# Patient Record
Sex: MALE | Race: WHITE
[De-identification: names, ages, dates, MRNs, and addresses within clinical notes are randomized per-mention and may not be internally consistent; named-entity substitution may affect disease eponyms.]

---

## 2019-09-25 ENCOUNTER — HOSPITAL ENCOUNTER (EMERGENCY)
Dept: HOSPITAL 47 - EC | Age: 59
Discharge: HOME | End: 2019-09-25
Payer: COMMERCIAL

## 2019-09-25 VITALS
HEART RATE: 70 BPM | DIASTOLIC BLOOD PRESSURE: 70 MMHG | SYSTOLIC BLOOD PRESSURE: 121 MMHG | TEMPERATURE: 97.8 F | RESPIRATION RATE: 18 BRPM

## 2019-09-25 DIAGNOSIS — F17.200: ICD-10-CM

## 2019-09-25 DIAGNOSIS — H53.2: Primary | ICD-10-CM

## 2019-09-25 DIAGNOSIS — H55.09: ICD-10-CM

## 2019-09-25 LAB
ALBUMIN SERPL-MCNC: 4 G/DL (ref 3.5–5)
ALP SERPL-CCNC: 83 U/L (ref 38–126)
ALT SERPL-CCNC: 18 U/L (ref 21–72)
ANION GAP SERPL CALC-SCNC: 9 MMOL/L
AST SERPL-CCNC: 21 U/L (ref 17–59)
BASOPHILS # BLD AUTO: 0.2 K/UL (ref 0–0.2)
BASOPHILS NFR BLD AUTO: 2 %
BUN SERPL-SCNC: 10 MG/DL (ref 9–20)
CALCIUM SPEC-MCNC: 9.3 MG/DL (ref 8.4–10.2)
CHLORIDE SERPL-SCNC: 105 MMOL/L (ref 98–107)
CO2 SERPL-SCNC: 27 MMOL/L (ref 22–30)
EOSINOPHIL # BLD AUTO: 0.3 K/UL (ref 0–0.7)
EOSINOPHIL NFR BLD AUTO: 2 %
ERYTHROCYTE [DISTWIDTH] IN BLOOD BY AUTOMATED COUNT: 4.89 M/UL (ref 4.3–5.9)
ERYTHROCYTE [DISTWIDTH] IN BLOOD: 12.3 % (ref 11.5–15.5)
GLUCOSE SERPL-MCNC: 92 MG/DL (ref 74–99)
HCT VFR BLD AUTO: 45.1 % (ref 39–53)
HGB BLD-MCNC: 14.9 GM/DL (ref 13–17.5)
LYMPHOCYTES # SPEC AUTO: 3 K/UL (ref 1–4.8)
LYMPHOCYTES NFR SPEC AUTO: 27 %
MAGNESIUM SPEC-SCNC: 2.1 MG/DL (ref 1.6–2.3)
MCH RBC QN AUTO: 30.5 PG (ref 25–35)
MCHC RBC AUTO-ENTMCNC: 33.1 G/DL (ref 31–37)
MCV RBC AUTO: 92.2 FL (ref 80–100)
MONOCYTES # BLD AUTO: 0.9 K/UL (ref 0–1)
MONOCYTES NFR BLD AUTO: 8 %
NEUTROPHILS # BLD AUTO: 6.6 K/UL (ref 1.3–7.7)
NEUTROPHILS NFR BLD AUTO: 59 %
PLATELET # BLD AUTO: 239 K/UL (ref 150–450)
POTASSIUM SERPL-SCNC: 3.8 MMOL/L (ref 3.5–5.1)
PROT SERPL-MCNC: 6.9 G/DL (ref 6.3–8.2)
SODIUM SERPL-SCNC: 141 MMOL/L (ref 137–145)
WBC # BLD AUTO: 11.2 K/UL (ref 3.8–10.6)

## 2019-09-25 PROCEDURE — 85025 COMPLETE CBC W/AUTO DIFF WBC: CPT

## 2019-09-25 PROCEDURE — 99284 EMERGENCY DEPT VISIT MOD MDM: CPT

## 2019-09-25 PROCEDURE — 83735 ASSAY OF MAGNESIUM: CPT

## 2019-09-25 PROCEDURE — 80053 COMPREHEN METABOLIC PANEL: CPT

## 2019-09-25 PROCEDURE — 93005 ELECTROCARDIOGRAM TRACING: CPT

## 2019-09-25 PROCEDURE — 70450 CT HEAD/BRAIN W/O DYE: CPT

## 2019-09-25 PROCEDURE — 36415 COLL VENOUS BLD VENIPUNCTURE: CPT

## 2019-09-25 NOTE — ED
Eye Problem HPI





- General


Chief complaint: Eye Problems


Stated complaint: Blurred vision


Time Seen by Provider: 09/25/19 18:23


Source: patient


Mode of arrival: ambulatory


Limitations: no limitations





- History of Present Illness


Initial comments: 


59-year-old male patient presents to the emergency department today for 

evaluation of double and blurred vision.  Patient states that he experiences 

whenever looking to the right.  Patient states when he drives he can see double 

lines on the road.  Patient states he has had some intermittent dizziness with 

this.  Symptoms started on Saturday.  He denies any head injury or history of 

similar symptoms.  States he did see a ophthalmologist on Monday who was unable 

to find a cause for his symptoms.  Patient denies any headache, numbness, 

tingling, weakness to his extremities.  States he is generally healthy with no 

chronic medical conditions.  He does not take any medications.  Patient has not 

seen a primary care physician for the last couple of years.  Denies alcohol or 

drug use. Patient denies any recent rash, fever, chills, shortness breath, chest

pain, abdominal pain, nausea, vomiting, diarrhea, constipation, back pain, 

hematuria, dysuria, urinary urgency, urinary frequency, or any other complaints.








- Related Data


                                Home Medications











 Medication  Instructions  Recorded  Confirmed


 


No Known Home Medications  09/25/19 09/25/19











                                    Allergies











Allergy/AdvReac Type Severity Reaction Status Date / Time


 


No Known Allergies Allergy   Verified 09/25/19 19:04














Review of Systems


ROS Statement: 


Those systems with pertinent positive or pertinent negative responses have been 

documented in the HPI.





ROS Other: All systems not noted in ROS Statement are negative.





Past Medical History


Past Medical History: No Reported History


History of Any Multi-Drug Resistant Organisms: None Reported


Additional Past Surgical History / Comment(s): dental surgery


Past Psychological History: No Psychological Hx Reported


Smoking Status: Current every day smoker


Past Alcohol Use History: None Reported


Past Drug Use History: None Reported





General Exam


Limitations: no limitations


General appearance: alert, in no apparent distress, other (This is a well-

developed, well-nourished adult male patient in no acute distress.  Vital signs 

upon presentation are temperature 97.9F, pulse 102, respirations 20, blood 

pressure 131/74, pulse ox 97% on room air.)


Eye exam: Present: normal appearance, PERRL, EOMI, nystagmus (Right horizontal 

gaze nystagmus).  Absent: scleral icterus, conjunctival injection, periorbital 

swelling


ENT exam: Present: normal exam, normal oropharynx, mucous membranes moist


Respiratory exam: Present: normal lung sounds bilaterally.  Absent: respiratory 

distress, wheezes, rales, rhonchi, stridor


Cardiovascular Exam: Present: regular rate, normal rhythm, normal heart sounds. 

Absent: systolic murmur, diastolic murmur, rubs, gallop, clicks


GI/Abdominal exam: Present: soft, normal bowel sounds.  Absent: distended, 

tenderness, guarding, rebound, rigid


Neurological exam: Present: alert, oriented X3, CN II-XII intact


Psychiatric exam: Present: normal affect, normal mood


Skin exam: Present: warm, dry, intact, normal color.  Absent: rash





Course


                                   Vital Signs











  09/25/19 09/25/19 09/25/19





  18:12 19:16 20:30


 


Temperature 97.9 F 98.1 F 97.8 F


 


Pulse Rate 102 H 71 70


 


Respiratory 20 16 18





Rate   


 


Blood Pressure 131/74 108/60 121/70


 


O2 Sat by Pulse 97 99 97





Oximetry   














Medical Decision Making





- Medical Decision Making


59-year-old male patient presented to the emergency department today for 

evaluation of monocular diplopia and intermittent dizziness.  Physical 

examination does reveal right horizontal gaze nystagmus but is otherwise 

unremarkable.  He is neurologically intact with no focal deficits.  Labs 

reviewed and are unremarkable.  EKG showed normal sinus rhythm.  CT brain was 

obtained and showed no acute abnormalities.  I did discuss findings and results 

with the patient.  Be discharged home to follow-up with the primary care 

physician, discuss referral to neurology, discussed MRI.  Return parameters were

discussed in detail.  He verbalizes understanding and agrees with this plan.








- Lab Data


Result diagrams: 


                                 09/25/19 19:05





                                 09/25/19 19:05


                                   Lab Results











  09/25/19 09/25/19 Range/Units





  19:05 19:05 


 


WBC  11.2 H   (3.8-10.6)  k/uL


 


RBC  4.89   (4.30-5.90)  m/uL


 


Hgb  14.9   (13.0-17.5)  gm/dL


 


Hct  45.1   (39.0-53.0)  %


 


MCV  92.2   (80.0-100.0)  fL


 


MCH  30.5   (25.0-35.0)  pg


 


MCHC  33.1   (31.0-37.0)  g/dL


 


RDW  12.3   (11.5-15.5)  %


 


Plt Count  239   (150-450)  k/uL


 


Neutrophils %  59   %


 


Lymphocytes %  27   %


 


Monocytes %  8   %


 


Eosinophils %  2   %


 


Basophils %  2   %


 


Neutrophils #  6.6   (1.3-7.7)  k/uL


 


Lymphocytes #  3.0   (1.0-4.8)  k/uL


 


Monocytes #  0.9   (0-1.0)  k/uL


 


Eosinophils #  0.3   (0-0.7)  k/uL


 


Basophils #  0.2   (0-0.2)  k/uL


 


Sodium   141  (137-145)  mmol/L


 


Potassium   3.8  (3.5-5.1)  mmol/L


 


Chloride   105  ()  mmol/L


 


Carbon Dioxide   27  (22-30)  mmol/L


 


Anion Gap   9  mmol/L


 


BUN   10  (9-20)  mg/dL


 


Creatinine   0.89  (0.66-1.25)  mg/dL


 


Est GFR (CKD-EPI)AfAm   >90  (>60 ml/min/1.73 sqM)  


 


Est GFR (CKD-EPI)NonAf   >90  (>60 ml/min/1.73 sqM)  


 


Glucose   92  (74-99)  mg/dL


 


Calcium   9.3  (8.4-10.2)  mg/dL


 


Magnesium   2.1  (1.6-2.3)  mg/dL


 


Total Bilirubin   0.6  (0.2-1.3)  mg/dL


 


AST   21  (17-59)  U/L


 


ALT   18 L  (21-72)  U/L


 


Alkaline Phosphatase   83  ()  U/L


 


Total Protein   6.9  (6.3-8.2)  g/dL


 


Albumin   4.0  (3.5-5.0)  g/dL














- EKG Data


-: EKG Interpreted by Me


EKG Comments: 





EKG obtained at 1944 shows sinus rhythm with PACs, ventricular rate 73, [place] 

interval 178, QRS duration 88, , .  No evidence of ST elevation or 

depression.





- Radiology Data


Radiology results: report reviewed, image reviewed


CT brain without contrast was obtained.  Report reviewed in its entirety.  

Impression by Dr. Dwyer shows No acute intracranial hemorrhage or midline shift. 

Orbits appear symmetric on CT.  There is further concern this patient with 

visual change MRI would be recommended to evaluate for increased intracranial 

pressure orbital pathology.





Disposition


Clinical Impression: 


 Diplopia





Disposition: HOME SELF-CARE


Condition: Good


Instructions (If sedation given, give patient instructions):  Diplopia (ED)


Additional Instructions: 


Increase fluids.  Follow-up with primary care physician for further evaluation 

and possible neurology referral.  Return to the emergency department for any 

new, worsening, or concerning symptoms.


Is patient prescribed a controlled substance at d/c from ED?: No


Referrals: 


Gus Bhatt MD [STAFF PHYSICIAN] - 1-2 days


Time of Disposition: 20:14

## 2019-09-25 NOTE — CT
EXAMINATION TYPE: CT brain wo con

 

DATE OF EXAM: 9/25/2019

 

COMPARISON: None

 

HISTORY: Blurry vision

 

CT DLP: 1012.7 mGycm

Automated exposure control for dose reduction was used.

 

TECHNIQUE: CT scan of the head is performed without contrast.

 

FINDINGS:   There is no acute intracranial hemorrhage, mass effect, or midline shift identified. No s
uspicious extra axial fluid collection is seen.  The ventricles and sulci are within normal limits in
 size. There is scant mucosal thickening within the ethmoid sinuses. The globes are intact and the re
maining visualized sinuses are clear. Lenses are symmetric and in place. Extraocular muscles are also
 symmetric. Optic nerves are unremarkable. No preseptal or post septal inflammatory fat stranding.

 

IMPRESSION:  

1. No acute intracranial hemorrhage or midline shift.

2. Orbits appear symmetric on CT. There is further concern in this patient with visual change MRI wou
ld be recommended to evaluate for increased intracranial pressure or orbital pathology.

## 2024-10-28 ENCOUNTER — HOSPITAL ENCOUNTER (OUTPATIENT)
Dept: HOSPITAL 47 - RADCTMAIN | Age: 64
Discharge: HOME | End: 2024-10-28
Attending: FAMILY MEDICINE
Payer: MEDICARE

## 2024-10-28 PROCEDURE — 71271 CT THORAX LUNG CANCER SCR C-: CPT

## 2024-10-28 NOTE — CTL
EXAMINATION TYPE:  CT Low Dose Lung

 

DATE OF EXAM ORDERED: 10/28/2024

 

COMPARISON: None

 

CLINICAL INDICATION: Male, 64 years old with history of Z12.2 ENCNTR SCREEN FOR MALIGNANT NEOPLASM OF
 RE; PHH, Personal hx nicotine dependence 1 ppd x 44 years  current smoker no concerns, Lung cancer s
creening, History of Smoking/tobacco use.

 

TECHNIQUE: Low dose computed tomography scan was performed through the chest at 1 mm thick sections a
nd reconstructed images in multiple planes at 1 mm and 5 mm thick sections.

CT DLP: 71 mGycm

CT CTDI: 1.95 mGy

Automated exposure control for dose reduction was used.

CT DIAGNOSTIC QUALITY: Satisfactory

 

FINDINGS:

 

There is no suspicious lung mass or nodule.

 

There is no abnormal airspace consolidation or abnormal interstitial density.

 

There is no pleural effusion or pneumothorax.

 

The great vessels chest are normal and is no mediastinal, hilar or axillary adenopathy.

 

Limited scanning through the upper abdomen reveals nonobstructing 7 mm left renal calcification.

 

No focal osseous lesions are seen.

 

IMPRESSION:

1. Lung RADS category 1 negative. Continue routine screening yearly intervals.

2. No acute cardiopulmonary disease.

3. Nonobstructing 7 mm left renal calcification.

 

[facility], Workstation: [email or web address], 10/28/2024 3:43 PM

## 2024-11-07 ENCOUNTER — HOSPITAL ENCOUNTER (OUTPATIENT)
Dept: HOSPITAL 47 - RADXRYALE | Age: 64
Discharge: HOME | End: 2024-11-07
Attending: PHYSICIAN ASSISTANT
Payer: MEDICARE

## 2024-11-07 DIAGNOSIS — M25.561: ICD-10-CM

## 2024-11-07 DIAGNOSIS — M16.11: ICD-10-CM

## 2024-11-07 DIAGNOSIS — M51.360: Primary | ICD-10-CM

## 2024-11-07 PROCEDURE — 73502 X-RAY EXAM HIP UNI 2-3 VIEWS: CPT

## 2024-11-07 PROCEDURE — 72110 X-RAY EXAM L-2 SPINE 4/>VWS: CPT

## 2025-02-19 ENCOUNTER — HOSPITAL ENCOUNTER (OUTPATIENT)
Dept: HOSPITAL 47 - OR | Age: 65
Setting detail: OBSERVATION
LOS: 2 days | Discharge: HOME | End: 2025-02-21
Attending: ORTHOPAEDIC SURGERY | Admitting: ORTHOPAEDIC SURGERY
Payer: MEDICARE

## 2025-02-19 DIAGNOSIS — D62: ICD-10-CM

## 2025-02-19 DIAGNOSIS — M25.751: ICD-10-CM

## 2025-02-19 DIAGNOSIS — M16.11: Primary | ICD-10-CM

## 2025-02-19 DIAGNOSIS — D72.829: ICD-10-CM

## 2025-02-19 DIAGNOSIS — Z87.891: ICD-10-CM

## 2025-02-19 DIAGNOSIS — Z79.899: ICD-10-CM

## 2025-02-19 DIAGNOSIS — E78.5: ICD-10-CM

## 2025-02-19 PROCEDURE — 73501 X-RAY EXAM HIP UNI 1 VIEW: CPT

## 2025-02-19 PROCEDURE — 97530 THERAPEUTIC ACTIVITIES: CPT

## 2025-02-19 PROCEDURE — 64999 UNLISTED PX NERVOUS SYSTEM: CPT

## 2025-02-19 PROCEDURE — 27130 TOTAL HIP ARTHROPLASTY: CPT

## 2025-02-19 PROCEDURE — 97166 OT EVAL MOD COMPLEX 45 MIN: CPT

## 2025-02-19 PROCEDURE — 85025 COMPLETE CBC W/AUTO DIFF WBC: CPT

## 2025-02-19 PROCEDURE — 97161 PT EVAL LOW COMPLEX 20 MIN: CPT

## 2025-02-19 PROCEDURE — 80048 BASIC METABOLIC PNL TOTAL CA: CPT

## 2025-02-19 RX ADMIN — Medication PRN ML: at 14:38

## 2025-02-19 RX ADMIN — ASPIRIN 81 MG CHEWABLE TABLET SCH MG: 81 TABLET CHEWABLE at 21:20

## 2025-02-19 RX ADMIN — HYDROCODONE BITARTRATE AND ACETAMINOPHEN PRN EACH: 10; 325 TABLET ORAL at 17:34

## 2025-02-19 RX ADMIN — DEXTROSE PRN MG: 50 INJECTION, SOLUTION INTRAVENOUS at 13:32

## 2025-02-19 RX ADMIN — CEFAZOLIN SCH MLS/HR: 330 INJECTION, POWDER, FOR SOLUTION INTRAMUSCULAR; INTRAVENOUS at 17:33

## 2025-02-19 RX ADMIN — POTASSIUM CHLORIDE ONE MLS: 14.9 INJECTION, SOLUTION INTRAVENOUS at 15:50

## 2025-02-19 RX ADMIN — DOCUSATE SODIUM AND SENNOSIDES SCH EACH: 50; 8.6 TABLET ORAL at 21:20

## 2025-02-19 RX ADMIN — ATORVASTATIN CALCIUM SCH MG: 20 TABLET, FILM COATED ORAL at 21:20

## 2025-02-19 RX ADMIN — DOCUSATE SODIUM PRN MG: 100 CAPSULE, LIQUID FILLED ORAL at 13:28

## 2025-02-19 RX ADMIN — ACETAMINOPHEN PRN MG: 500 TABLET ORAL at 13:29

## 2025-02-19 RX ADMIN — POTASSIUM CHLORIDE SCH MLS/HR: 14.9 INJECTION, SOLUTION INTRAVENOUS at 13:31

## 2025-02-19 RX ADMIN — FAMOTIDINE PRN MG: 10 INJECTION, SOLUTION INTRAVENOUS at 13:32

## 2025-02-19 RX ADMIN — ONDANSETRON PRN MG: 2 INJECTION INTRAMUSCULAR; INTRAVENOUS at 13:32

## 2025-02-19 RX ADMIN — OXYCODONE HYDROCHLORIDE PRN MG: 10 TABLET, FILM COATED, EXTENDED RELEASE ORAL at 13:28

## 2025-02-19 RX ADMIN — KETOROLAC TROMETHAMINE PRN MG: 15 INJECTION, SOLUTION INTRAMUSCULAR; INTRAVENOUS at 13:32

## 2025-02-19 RX ADMIN — MIDAZOLAM ONE MG: 1 INJECTION INTRAMUSCULAR; INTRAVENOUS at 13:41

## 2025-02-19 RX ADMIN — POTASSIUM CHLORIDE ONE MLS: 14.9 INJECTION, SOLUTION INTRAVENOUS at 12:52

## 2025-02-19 NOTE — P.HPIM
History of Present Illness


H&P Date: 02/19/25


Chief Complaint: Right hip surgery





Very pleasant 65-year-old patient who follows with Dr. Nelly Baum.


Patient has undergone right total hip arthroplasty.  Postprocedure laying in 

bed.  Pain controlled.  No nausea vomiting.  Denies any cardiac history.  Dominique reid has arthritis in other joints.  Hyperlipidemia.  And stopped smoking about 

3 months ago.  Denies any respiratory symptoms.





Review of systems:


GEN.: None


EYES: None


HEENT: None


NECK: None


RESPIRATORY: None


CARDIOVASCULAR: None


GASTROINTESTINAL: None


GENITOURINARY: None


MUSCULOSKELETAL: Multiple joint pains including the left hip e


LYMPHATICS: None


HEMATOLOGICAL: None


PSYCHIATRY: None


NEUROLOGICAL: None





Social history:


Lives with a friend, whose care patient takes off.  Smoked a pack a day for 

close to 40 years.  Stopped in November 2024.  No alcohol.  Used to work on 

cranes.





Physical examination:


VITAL SIGNS: 98.6, 79, 16, 1 4586, 97% room air


GENERAL: BMI 29.5, awake laying in bed.


EYES: Pupils equal.  Conjunctiva song l.


HEENT: External appearance of nose and ears normal, oral cavity grossly normal. 

A lot of facial hair in form of beard.


NECK: JVD not raised; masses not palpable.


HEART: First and second heart sounds are normal;  no edema.  


LUNGS: Respiratory rate normal; clear to auscultation.  


ABDOMEN: Soft,  nontender, liver spleen not palpable, no masses palpable.  


PSYCH: Alert and oriented x3;  mood  and affect song l.  


MUSCULOSKELETAL:No Clubbing/cyanosis;muscles-grossly intact.  Dressing over the 

right thigh incision site.  OA in other joints.


NEUROLOGICAL: Cranial nerves grossly intact; no facial asymmetry,   power and 

sensation grossly intact. 


LYMPHATICS: No lymph nodes palpable in the axilla and neck





INVESTIGATIONS, reviewed in the clinical context:





February 14, 2025: White count 11.7 hemoglobin 15.6 platelets 261 sodium 139 

potassium 5.1 creatinine 1.0





Assessment plan:





-Anterior, right total hip arthroplasty


Aspirin 81 twice daily for DVT prophylaxis.  Received IV cefazolin for infection

prophylaxis.  Analgesics in place.





-Primary osteoarthritis of multiple joints.  Also due for surgery in the left 

hip down the road.


Pain medication as needed





-Hyperlipidemia


Lipitor





Care was discussed with the patient.  Questions answered.





Thank you Dr. Martin





Past Medical History


Past Medical History: Hyperlipidemia


Additional Past Medical History / Comment(s): "right hip is worn out"


History of Any Multi-Drug Resistant Organisms: None Reported


Additional Past Surgical History / Comment(s): dental surgery, colonoscopy


Past Anesthesia/Blood Transfusion Reactions: No Reported Reaction


Past Psychological History: No Psychological Hx Reported


Smoking Status: Former smoker


Past Alcohol Use History: None Reported


Additional Past Alcohol Use History / Comment(s): quit smoking 11/2024 - smoked 

ppd x 43 yrs.


Past Drug Use History: None Reported





Medications and Allergies


                                Home Medications











 Medication  Instructions  Recorded  Confirmed  Type


 


Atorvastatin [Lipitor] 20 mg PO HS 02/13/25 02/19/25 History








                                    Allergies











Allergy/AdvReac Type Severity Reaction Status Date / Time


 


No Known Allergies Allergy   Verified 02/19/25 13:09














Physical Exam


Vitals: 


                                   Vital Signs











  Temp Pulse Pulse Resp BP Pulse Ox


 


 02/19/25 17:53  98.6 F  79   16  145/86  97


 


 02/19/25 17:04   82   18  150/99  96


 


 02/19/25 16:49   79   17  160/85  100


 


 02/19/25 16:34   79   14  126/65  99


 


 02/19/25 16:19  97.8 F  89   12  141/75  98


 


 02/19/25 13:46    70  16  142/82  99


 


 02/19/25 13:03  98.6 F   72  16  154/81  98








                                Intake and Output











 02/19/25 02/19/25 02/19/25





 06:59 14:59 22:59


 


Intake Total  1050 640


 


Output Total   300


 


Balance  1050 340


 


Intake:   


 


  IV  1050 200


 


  Intake, IV Titration   200





  Amount   


 


    Sodium Chloride 0.9% 1,   200





    000 ml @ 100 mls/hr IV .   





    [facility] Rx#:[id number]   


 


  Oral   240


 


Output:   


 


  Estimated Blood Loss   300


 


Other:   


 


  Weight  93.2 kg 93.2 kg














Thrombosis Risk Factor Assmnt





- Choose All That Apply


Other Risk Factors: Yes


Each Risk Factor Represents 2 Points: Age 61-74 years


Each Risk Factor Represents 5 Points: Elective major lower extremity arthoplasty


Thrombosis Risk Factor Assessment Total Risk Factor Score: 7


Thrombosis Risk Factor Assessment Level: High Risk

## 2025-02-19 NOTE — XR
Intraoperative/procedural fluoroscopic services were provided for right total hip arthroplasty. Total
 fluoroscopy time is 52.8 seconds with a total of 8 submitted images to PACS. Total DAP 3.7266 Gycm2.
  Please see the operative note for further details.

 

[facility], Workstation: [id number], 2/19/2025 4:19 PM

## 2025-02-19 NOTE — P.OP
Date of Procedure: 02/19/25


Preoperative Diagnosis: 


Severe right hip osteoarthritis


Postoperative Diagnosis: 


Same


Procedure(s) Performed: 


Right direct anterior total hip arthroplasty


Implants: 


1. Mindy Trident II Acetabular Cup, Size #54


2. [place] Insignia Size #6   Femoral Stem, High Offset


3. Biolox delta femoral head, 36 mm, +0 mm neck


Anesthesia: GETA, regional


Surgeon: Mak Martin


Assistant #1: Morgan Malik


Estimated Blood Loss (ml): 300


IV fluids (ml): 800


Pathology: none sent


Condition: stable


Disposition: PACU


Indications for Procedure: 


I had a long discussion with the patient in the office on the potential risks 

and complications of an elective total hip replacement through a direct anterior

approach.  Risks discussed include, but are certainly not limited to, risks from

anesthesia, superficial infection requiring local wound care or antibiotics, 

deep akhil-prosthetic joint infection and the treatment required to eradicate 

infection, intraoperative fracture, postoperative periprosthetic fracture, 

damage to local blood vessels or nerves particularly the lateral femoral 

cutaneous nerve, delayed wound healing requiring local wound care or possibly 

surgical debridement, hip dislocation, leg length discrepancy, soft tissue 

irritation around the total hip implant such as iliopsoas tendinitis or 

trochanteric bursitis, wear and osteolysis from the implants, squeaking or 

audible noises, groin pain, thigh pain, heterotopic ossification, stiffness, 

aseptic loosening of the implants, dissatisfaction with surgical outcome, need 

for revision surgery, DVT, PE, swelling of the operative extremity, acute 

coronary event, stroke, failure to thrive, and possibly loss of life or limb.  

The patient understands that while these are the most common complications after

an elective hip replacement there are certainly other less common complications 

possible.  They were given ample time to ask questions regarding the potential 

complications of a hip replacement.  Following our discussion the patient pro

vided their verbal and written consent to go forward with an elective total hip 

replacement.





The patient has a history of smoking.  He was able to quit 3 months prior to 

surgery.  He assured me that he has not been smoking for 3 months.  He 

understands the ramifications if he resumes smoking particularly delayed wound 

healing and infection.  He promised me he would not smoke in the postoperative 

period.


Operative Findings: 


Severe right hip osteoarthritis.  There were circumferential osteophytes 

surrounding the acetabulum.  After the cup was placed the osteophytes were 

carefully removed.


Description of Procedure: 


The patient was identified in the preoperative holding area and the correct hip 

was marked with my initials.  I reviewed the procedure and consent with the 

patient.  All of their questions were answered.  The patient was then brought 

back into the operating room by anesthesia.  While on the [facility] anesthesia was 

administered by the anesthesia team.  Preoperative antibiotics and tranexamic 

acid were also given.  After the patient was under anesthesia I examined their 

ankles to determine their preoperative leg length discrepancy.  The skin over 

the anterior aspect of the hip was shaved to remove hair over the site of 

planned incision.  Both feet and ankles were padded with webril and boots for 

the [place] were applied.  The patient was then carefully transferred onto the [place]

table.  A perineal post was immediately placed.  The arms were placed on arm 

holders and were well-padded.  Both boots were secured to the spars on the [place] 

table.  The patient was positioned so that the pelvis was centered over the 

post.  Nonsterile drapes were applied.  A timeout was performed identifying the 

correct patient, operative extremity, and procedure.  At this point fluoroscopy 

was brought in to take preoperative images of the pelvis and operative hip.  

Using the standing AP pelvis from the office as a template, a comparable image 

was obtained with fluoroscopy.  A metallic bar was used to create a bi-ischial 

line for use as a reference to leg length adjustments during the procedure.  

Global offset was also measured on both the operative and nonoperative leg.  

Fluoroscopy was then brought out and a pre-scrub using a chlorhexidine scrub 

brush was performed.  The operative limb was then prepped and draped in the 

standard sterile fashion.





An anterior longitudinal incision was made lateral and distal to the ASIS.  The 

skin and subcutaneous tissues were incised sharply.  The underlying tensor 

fascia was identified and incised in its midportion.  The fascia was dissected 

free from the underlying muscle and the muscle belly was retracted.  A blunt 

tipped cobra retractor was placed over the superior neck under the muscle fibers

of the gluteus minimus.  The deep enveloping fascia of the tensor was incised.  

The anterior leash of vessels were then identified and cauterized.  The fascia 

between the rectus and the capsule was then incised and the pre-capsular fat was

excised.  A second Cobra was placed inferior to the neck.  The interval between 

the rectus and iliocapsularis and the hip capsule was developed and a retractor 

was placed carefully over the anterior rim of the acetabulum.  A T-shaped 

anterior capsulotomy was performed.  The superior capsular leaflet was left in 

place in the inferior capsular flap was excised.  The Cobra retractors were 

placed intracapsularly.  We then made a femoral neck osteotomy according to 

preoperative and intraoperative templating and confirmed the level of the 

osteotomy using fluoroscopic imaging.  The femoral head was removed, passed off 

to the back table, and sized.  The superior capsular flap was excised.  

Retractors were placed circumferentially exposing the acetabulum.  We then 

circumferentially debrided the acetabulum free of labrum and osteophytes.  The 

pulvinar was removed to fully visualize the cotyloid fossa.  We then 

sequentially reamed to achieve peripheral fit and excellent bleeding subchondral

bone.  The socket was thoroughly irrigated.  The acetabular component was 

impacted into the appropriate position using fluoroscopy to guide version, 

inclination, and depth of insertion taking care to have a comparable image of 

the AP pelvis to the standing image taken in the office.  An excellent press-fit

was achieved and final position was confirmed using fluoroscopy.  The press fit 

was augmented with a bony cancellus dome screw.  The liner was then impacted 

into the socket.





Attention was then turned to the femur.  The remnant dorsal lateral capsule was 

excised.  The short external rotators were visible and protected.  A bone hook 

was used to confirm appropriate translation of the trochanter away from the 

acetabulum.  The leg was then extended and adducted and the bone hook was used 

to elevate the femur for broaching.  A box osteotome and blunt tipped canal 

sound was then utilized to gain access to the femoral canal.  We then 

sequentially broached the femur in appropriate anteversion until excellent 

torsional stability was achieved.  The neck cut was brought flush to the trial 

broach with a calcar planar.  A trial neck and head were then placed onto the 

broach and the hip was atraumatically reduced under direct visualization.  

External rotation to 90 was performed to assess stability.  Fluoroscopy was 

brought in.  An AP and lateral fluoroscopic image of the proximal femur was 

obtained to assess position and fill of the trial broach.  An AP of the pelvis 

was then obtained and matched to the preoperative image taken. A bi-ischial bar 

was then placed and measurements were taken to assess changes in length and 

offset.  The hip was then carefully dislocated, the proximal femur was exposed, 

and the trial implants were removed.  The wound and proximal femur was 

thoroughly irrigated using sterile saline and pulsatile lavage.  The final 

femoral implant was dispensed and gently tapped into place generating an 

excellent press-fit.  The trunnion was cleansed and the final head was tapped 

into place to engage the Greene taper.  The acetabulum was irrigated and 

visualized to be free of debris.  The hip was carefully reduced.  Stability was 

checked clinically with external rotation to 90 and there was no evidence of 

instability.  Final fluoroscopic images were taken.  The wound was then 

thoroughly irrigated and soaked with a dilute Betadine rinse for 3 minutes.  3 L

of sterile saline was irrigated through the wound using pulsatile lavage.  Local

anesthetic  cocktail was injected into the soft tissues around the surgical f

ield.   The wound was then closed in layers.  A sterile dressing was placed over

the surgical incision.  The drapes were taken down and the patient was carefully

transferred off of the [place] table.  Following removal of the  boots the leg 

lengths felt acceptable.  The patient was then taken to recovery room having 

tolerated the procedure well.








Morgan Malik PA-C was required as a skilled assistant due to the complexity 

of surgery for patient positioning, draping, exposure, retraction, closure of 

wound and application of dressing. 





PLAN: The patient can weight-bear as tolerated on the operative extremity.  2 

doses of postoperative antibiotics.  DVT prophylaxis with aspirin 81 mg twice a 

day based on preoperative risk stratification.  Physical therapy for gait 

training.

## 2025-02-20 VITALS — RESPIRATION RATE: 18 BRPM

## 2025-02-20 LAB
BASOPHILS # BLD AUTO: 0.04 X 10*3/UL (ref 0–0.1)
BASOPHILS NFR BLD AUTO: 0.2 %
EOSINOPHIL # BLD AUTO: 0.09 X 10*3/UL (ref 0.04–0.35)
EOSINOPHIL NFR BLD AUTO: 0.4 %
ERYTHROCYTE [DISTWIDTH] IN BLOOD BY AUTOMATED COUNT: 3.78 X 10*6/UL (ref 4.4–5.6)
ERYTHROCYTE [DISTWIDTH] IN BLOOD: 12.3 % (ref 11.5–14.5)
HCT VFR BLD AUTO: 35.3 % (ref 39.6–50)
HGB BLD-MCNC: 11.9 G/DL (ref 13–17)
IMM GRANULOCYTES BLD QL AUTO: 1 %
LYMPHOCYTES # SPEC AUTO: 1.14 X 10*3/UL (ref 0.9–5)
LYMPHOCYTES NFR SPEC AUTO: 5 %
MCH RBC QN AUTO: 31.5 PG (ref 27–32)
MCHC RBC AUTO-ENTMCNC: 33.7 G/DL (ref 32–37)
MCV RBC AUTO: 93.4 FL (ref 80–97)
MONOCYTES # BLD AUTO: 1.75 X 10*3/UL (ref 0.2–1)
MONOCYTES NFR BLD AUTO: 7.6 %
NEUTROPHILS # BLD AUTO: 19.77 X 10*3/UL (ref 1.8–7.7)
NEUTROPHILS NFR BLD AUTO: 85.8 %
NRBC BLD AUTO-RTO: 0 X 10*3/UL (ref 0–0.01)
PLATELET # BLD AUTO: 230 X 10*3/UL (ref 140–440)
WBC # BLD AUTO: 23.02 X 10*3/UL (ref 4.5–10)

## 2025-02-20 RX ADMIN — SODIUM FERRIC GLUCONATE COMPLEX SCH MLS/HR: 12.5 INJECTION INTRAVENOUS at 17:58

## 2025-02-20 RX ADMIN — THERA TABS SCH EACH: TAB at 11:02

## 2025-02-20 NOTE — P.PN
Progress Note - Text


Progress Note Date: 02/20/25





Chief Complaint: Right hip surgery





Very pleasant 65-year-old patient who follows with Dr. Nelly Baum.


Patient has undergone right total hip arthroplasty.  Postprocedure laying in 

bed.  Pain controlled.  No nausea vomiting.  Denies any cardiac history.  

Patient has arthritis in other joints.  Hyperlipidemia.  And stopped smoking 

about 3 months ago.  Denies any respiratory symptoms.





February 20: Did ambulate.  Pain is present.  Tolerated diet.  No dizziness no 

lightheadedness.





Active Medications





Hydrocodone Bitart/Acetaminophen (Hydrocodone/Apap 5-325mg 1 Each Tab)  1 each 

PO Q6HR PRN


   PRN Reason: Pain Scale 1 to 5


   Stop: 03/21/25 16:00


Hydrocodone Bitart/Acetaminophen (Hydrocodone/Apap 10-325mg 1 Each Tab)  1 each 

PO Q6H PRN


   PRN Reason: Pain Scale 6 to 10


   Stop: 03/21/25 16:00


   Last Admin: 02/20/25 11:02 Dose:  1 each


   


Aspirin (Aspirin 81 Mg)  81 mg PO BID [facility]


   Stop: 03/21/25 20:59


   Last Admin: 02/20/25 08:10 Dose:  81 mg


   


Atorvastatin Calcium (Atorvastatin 20 Mg Tab)  20 mg PO HS [facility]


   Last Admin: 02/19/25 21:20 Dose:  20 mg


   


Diazepam (Diazepam 5 Mg Tab)  2.5 mg PO Q8HR PRN


   PRN Reason: Mild Spasms


   Stop: 03/21/25 16:00


Diazepam (Diazepam 5 Mg Tab)  5 mg PO Q8HR PRN


   PRN Reason: Moderate to Severe Spasms


   Stop: 03/21/25 16:00


Hydromorphone HCl (Hydromorphone 0.5 Mg/0.5 Ml Syringe)  0.125 mg IVP Q3HR PRN


   PRN Reason: Pain Scale 1 to 3


   Stop: 03/21/25 16:00


Hydromorphone HCl (Hydromorphone 0.5 Mg/0.5 Ml Syringe)  0.5 mg IVP Q3HR PRN


   PRN Reason: Pain Scale 7 to 10


   Stop: 03/21/25 16:00


Hydromorphone HCl (Hydromorphone 0.5 Mg/0.5 Ml Syringe)  0.25 mg IVP Q3HR PRN


   PRN Reason: Pain Scale 4 to 6


   Stop: 03/21/25 16:00


Hydroxyzine Pamoate (Hydroxyzine Pamoate 25 Mg Cap)  25 mg PO Q4HR PRN


   PRN Reason: Nausea, Anxiety, Pain Control


   Stop: 03/21/25 16:00


Lactated Ringer's (Lactated Ringers)  1,000 mls @ 20 mls/hr IV .Q24H STEVE


   Stop: 03/21/25 12:38


   Last Admin: 02/20/25 12:45 Dose:  Not Given


   


Sodium Chloride (Saline 0.9%)  1,000 mls @ 100 mls/hr IV .Q10H STEVE


   Stop: 03/21/25 16:14


   Last Admin: 02/20/25 12:45 Dose:  Not Given


   


Magnesium Hydroxide (Magnesium Hydroxide 2,400 Mg/30 Ml Cup)  2,400 mg PO DAILY 

PRN


   PRN Reason: Constipation


   Stop: 03/21/25 16:00


Multivitamins (Multivitamins, Thera 1 Each Tab)  1 each PO DAILY@1200 STEVE


   Stop: 03/22/25 11:59


   Last Admin: 02/20/25 11:02 Dose:  1 each


   


Naloxone HCl (Naloxone 0.4 Mg/Ml 1 Ml Vial)  0.2 mg IV Q2M PRN


   PRN Reason: Opioid Reversal


   Stop: 03/21/25 16:00


Senna/Docusate Sodium (Sennosides-Docusate Sodium 1 Each Tab)  2 each PO HS STEVE


   Stop: 03/21/25 20:59


   Last Admin: 02/19/25 21:20 Dose:  2 each


   


Temazepam (Temazepam 15 Mg Cap)  15 mg PO HS PRN


   PRN Reason: Insomnia


   Stop: 03/21/25 16:00








Social history:


Lives with a friend, whose care patient takes off.  Smoked a pack a day for 

close to 40 years.  Stopped in November 2024.  No alcohol.  Used to work on 

cranes.





Physical examination:


VITAL SIGNS: 98.1, 76, 16, 124/74, 97% room air


GENERAL: BMI 29.5, awake laying in bed.


EYES: Pupils equal.  Conjunctiva song l.


HEENT: External appearance of nose and ears normal, oral cavity grossly normal. 

A lot of facial hair in form of beard.


NECK: JVD not raised; masses not palpable.


HEART: First and second heart sounds are normal;  no edema.  


LUNGS: Respiratory rate normal; clear to auscultation.  


ABDOMEN: Soft,  nontender, liver spleen not palpable, no masses palpable.  


PSYCH: Alert and oriented x3;  mood  and affect song l.  


MUSCULOSKELETAL:No Clubbing/cyanosis;muscles-grossly intact.  Dressing over the 

right thigh incision site.  OA in other joints.








INVESTIGATIONS, reviewed in the clinical context:


February 20: White count 23.0 hemoglobin 11.9 platelets 230


February 14, 2025: White count 11.7 hemoglobin 15.6 platelets 261 sodium 139 

potassium 5.1 creatinine 1.0





Assessment plan:





-Anterior, right total hip arthroplasty


Aspirin 81 twice daily for DVT prophylaxis.  Received IV cefazolin for infection

prophylaxis.  Analgesics in place.





-Acute postprocedure blood loss anemia expected from surgery


IV Ferrlecit.  Oral iron





-Leukocytosis, likely reactive from surgery.  No clinical evidence of infection


Denies any respiratory/urinary symptoms





-Primary osteoarthritis of multiple joints.  Also due for surgery in the left 

hip down the road.


Pain medication as needed





-Hyperlipidemia


Lipitor





Discussed





Thank you Dr. Martin





Past Medical History


Past Medical History: Hyperlipidemia


Additional Past Medical History / Comment(s): "right hip is worn out"


History of Any Multi-Drug Resistant Organisms: None Reported


Additional Past Surgical History / Comment(s): dental surgery, colonoscopy


Past Anesthesia/Blood Transfusion Reactions: No Reported Reaction


Past Psychological History: No Psychological Hx Reported


Smoking Status: Former smoker


Past Alcohol Use History: None Reported


Additional Past Alcohol Use History / Comment(s): quit smoking 11/2024 - smoked 

ppd x 43 yrs.


Past Drug Use History: None Reported No

## 2025-02-20 NOTE — P.PN
Subjective





Doing relatively well this morning.  He states that yesterday after surgery he 

had no pain likely due to the block and was walking around the [place].  During the

night he reached over in bed and felt a minor pop in the hip.  He had some 

increased pain but is doing well this morning.  He has no other complaints.





Objective





- Vital Signs


Vital signs: 


                                   Vital Signs











Temp  97.7 F   02/20/25 01:50


 


Pulse  84   02/20/25 01:50


 


Resp  18   02/20/25 01:50


 


BP  123/74   02/20/25 01:50


 


Pulse Ox  97   02/20/25 01:50


 


FiO2      








                                 Intake & Output











 02/19/25 02/20/25 02/20/25





 18:59 06:59 18:59


 


Intake Total 1690 480 


 


Output Total 300 900 


 


Balance 1390 -420 


 


Weight 93.2 kg  


 


Intake:   


 


  IV 1250  


 


  Intake, IV Titration 200  





  Amount   


 


    Sodium Chloride 0.9% 1, 200  





    000 ml @ 100 mls/hr IV .   





    Q10H STEVE Rx#:[id number]   


 


  Oral 240 480 


 


Output:   


 


  Urine  900 


 


    Straight  900 


 


  Estimated Blood Loss 300  














- Exam





Patient is resting comfortably in bed.  He is alert and able to answer 

questions.  On inspection leg lengths appear symmetric.  There is a dressing 

over the anterior aspect of the right hip.  There is no drainage or 

strikethrough.  There is mild swelling.  Femoral nerve function is intact.  The 

patient is able to actively plantarflex and dorsiflex his ankle and his toes.





Assessment and Plan


Assessment: 





Postoperative day #1 status post right direct anterior total hip arthroplasty


Former smoker, quit 3 to 4 months ago


Plan: 





1.  Weightbearing as tolerated on the operative extremity.  Up with assistance 

and a walker.


2.  DVT prophylaxis with aspirin 81 mg twice a day


3.  Physical therapy for gait training and mobilization


4.  Leave surgical dressing in place.


5. Internal medicine for perioperative medical management


6.  Disposition: The patient lives alone and would like to discharge to subacute

nursing or rehab.  We discussed this would depend on how he does with physical 

therapy.  We will see how he does today and make a decision on discharge 

tomorrow.  He understands the possibility of going home with home health care as

he is doing relatively well at this time.

## 2025-02-20 NOTE — P.ANPRN
Procedure Note - Anesthesia





- Nerve Block Performed


  ** Right Garza Single


Time Out Performed: Yes


Date of Procedure: 02/19/25


Procedure Start Time: 13:40


Procedure Stop Time: 13:46


Location of Patient: PreOp


Indication: Acute Post-Operative Pain, Requested by Surgeon


Sedation Type: Sedate with meaningful contact maintained


Preparation: Sterile Prep


Position: Supine


Needle Types: Pajunk


Needle Gauge: 21


Ultrasound used to visualize needle placement: Yes


Ultrasound used to observe medication spread: Yes


Blood Aspirated: No


Pain Paresthesia on Injection Noted: No


Resistance on Injection: Normal


Image Stored and Saved: Yes


Events: Uneventful and Well Tolerated (Ropivacaine 0.5% 20 cc was dexamethasone 

4 mg)

## 2025-02-21 VITALS — DIASTOLIC BLOOD PRESSURE: 72 MMHG | TEMPERATURE: 98 F | HEART RATE: 76 BPM | SYSTOLIC BLOOD PRESSURE: 118 MMHG

## 2025-02-21 LAB
ANION GAP SERPL CALC-SCNC: 5 MMOL/L
BASOPHILS # BLD AUTO: 0.1 K/UL (ref 0–0.2)
BASOPHILS NFR BLD AUTO: 0 %
BUN SERPL-SCNC: 25 MG/DL (ref 9–20)
CALCIUM SPEC-MCNC: 8.9 MG/DL (ref 8.4–10.2)
CHLORIDE SERPL-SCNC: 105 MMOL/L (ref 98–107)
CO2 SERPL-SCNC: 27 MMOL/L (ref 22–30)
EOSINOPHIL # BLD AUTO: 0.1 K/UL (ref 0–0.7)
EOSINOPHIL NFR BLD AUTO: 0 %
ERYTHROCYTE [DISTWIDTH] IN BLOOD BY AUTOMATED COUNT: 3.57 M/UL (ref 4.3–5.9)
ERYTHROCYTE [DISTWIDTH] IN BLOOD: 12.6 % (ref 11.5–15.5)
GLUCOSE SERPL-MCNC: 115 MG/DL (ref 74–99)
HCT VFR BLD AUTO: 33.7 % (ref 39–53)
HGB BLD-MCNC: 11.4 GM/DL (ref 13–17.5)
LYMPHOCYTES # SPEC AUTO: 2.7 K/UL (ref 1–4.8)
LYMPHOCYTES NFR SPEC AUTO: 16 %
MCH RBC QN AUTO: 31.9 PG (ref 25–35)
MCHC RBC AUTO-ENTMCNC: 33.8 G/DL (ref 31–37)
MCV RBC AUTO: 94.6 FL (ref 80–100)
MONOCYTES # BLD AUTO: 1.8 K/UL (ref 0–1)
MONOCYTES NFR BLD AUTO: 11 %
NEUTROPHILS # BLD AUTO: 11.5 K/UL (ref 1.3–7.7)
NEUTROPHILS NFR BLD AUTO: 70 %
PLATELET # BLD AUTO: 211 K/UL (ref 150–450)
POTASSIUM SERPL-SCNC: 4.2 MMOL/L (ref 3.5–5.1)
SODIUM SERPL-SCNC: 137 MMOL/L (ref 137–145)
WBC # BLD AUTO: 16.3 K/UL (ref 3.8–10.6)

## 2025-02-21 NOTE — P.DS
Providers


Date of admission: 


02/19/25 12:28





Attending physician: 


Mak Martin





Consults: 





                                        





02/19/25 17:50


Consult Physician Routine 


   Consulting Provider: Alfredo Clemons


   Consult Reason/Comments: post op medical management


   Do you want consulting provider notified?: Yes











Primary care physician: 


Nelly Baum MD





Hospital Course: 


This is a 65-year-old patient, with past medical history of severe right hip 

osteoarthritis, who failed nonsurgical conservative management. On 2/19/2025 the

patient presented to the [facility] pre-op department for scheduled 

right direct anterior total hip arthroplasty with Dr. Martin.  The patient 

tolerated the procedure well. The patient was transferred to the orthopedic 

floor. The patient had no acute events over night.  The patient's pain has been 

well-controlled.  





Patient was examined at bedside this morning. 


Patient is resting comfortably in bed.  No apparent distress.  They are awake, 

alert and able to answer questions.  On inspection the right surgical hip 

dressing is intact, there is no drainage or strikethrough.  The skin surrounding

the dressing is free of erythema.  There is mild swelling in the operative 

thigh.  Operative femoral nerve function is intact.  The operative calf is soft 

to compression. The patient is able to actively plantarflex and dorsiflex their 

operative ankle and toes.  Their operative foot appears well perfused with 

capillary refill under 2 seconds. 


Start form completed and placed in the patient's chart.


Walker order signed and placed in the patient's chart.


Patient worked with physical therapy and it was determined they could discharge 

home. 


Plan to discharge home today.  


Plan follow up in two weeks in our office. 


Please see med rec for a list of accurate medications.





Assessment: 


Status post right total hip arthroplasty for severe right hip osteoarthritis


Right hip pain





Plan - Discharge Summary


Discharge Rx Participant: No


New Discharge Prescriptions: 


New


   HYDROcodone/APAP 5-325MG [Norco 5] 1 - 2 each PO Q6HR PRN #48 tab


     PRN Reason: Pain


   Omeprazole 20 mg PO DAILY #30 tab


   Sennosides-Docusate Sodium [Senokot-S] 1 tab PO BID PRN #60 tablet


     PRN Reason: Constipation


   Diclofenac Sodium [Voltaren] 75 mg PO BID PRN #60 tab


     PRN Reason: Pain


   Aspirin 81 mg PO BID #60 tab





No Action


   Atorvastatin [Lipitor] 20 mg PO HS


Discharge Medication List





Atorvastatin [Lipitor] 20 mg PO HS 02/13/25 [History]


Aspirin 81 mg PO BID #60 tab 02/21/25 [Rx]


Diclofenac Sodium [Voltaren] 75 mg PO BID PRN #60 tab 02/21/25 [Rx]


HYDROcodone/APAP 5-325MG [Norco 5] 1 - 2 each PO Q6HR PRN #48 tab 02/21/25 [Rx]


Omeprazole 20 mg PO DAILY #30 tab 02/21/25 [Rx]


Sennosides-Docusate Sodium [Senokot-S] 1 tab PO BID PRN #60 tablet 02/21/25 [Rx]








Follow up Appointment(s)/Referral(s): 


Mak Martin MD [Medical Doctor] - 2 Weeks


Activity/Diet/Wound Care/Special Instructions: 


1.  Weight-bear as tolerated on your operative extremity unless instructed 

otherwise.  Use a walker or other assistive device to ambulate.


2.  Leave surgical dressing in place.  If your dressing becomes saturated with 

blood, there is drainage, or the dressing becomes loose please contact the 

office.


3.  It is okay to shower with your surgical dressing, but do not submerge in 

water (no hot tubs, bath's,  swimming etc.)


4.  Take your blood clot prevention medication as prescribed (aspirin, Eliquis, 

Xarelto, and Plavix are commonly prescribed medications for blood clot 

prevention)


5.  While taking Norco or Percocet for pain take a stool softener (Ex: Colace) 

and drink lots of water.


6.  Keep all follow-up appointments as scheduled.  You will usually be seen in 

1-2 weeks following surgery. 


7.  Please contact the office with any questions or concerns [phone]


Discharge Disposition: HOME SELF-CARE

## 2025-02-21 NOTE — P.PN
Progress Note - Text


Progress Note Date: 02/21/25





Chief Complaint: Right hip surgery





Very pleasant 65-year-old patient who follows with Dr. Nelly Baum.


Patient has undergone right total hip arthroplasty.  Postprocedure laying in 

bed.  Pain controlled.  No nausea vomiting.  Denies any cardiac history.  

Patient has arthritis in other joints.  Hyperlipidemia.  And stopped smoking 

about 3 months ago.  Denies any respiratory symptoms.





February 20: Did ambulate.  Pain is present.  Tolerated diet.  No dizziness no 

lightheadedness.


February 21: Doing well.  Pain controlled.  Tolerating diet.  No new issues.  

Discussed.  Patient to follow-up with PCP.











Social history:


Lives with a friend, whose care patient takes off.  Smoked a pack a day for 

close to 40 years.  Stopped in November 2024.  No alcohol.  Used to work on 

cranes.





Physical examination:


VITAL SIGNS: 98, 76, 18, 118 x 72, 97% room air


GENERAL: BMI 29.5, sitting up comfortable


EYES: Pupils equal.  Conjunctiva song l.


HEENT: External appearance of nose and ears normal, oral cavity grossly normal. 

A lot of facial hair in form of beard.


NECK: JVD not raised; masses not palpable.


HEART: First and second heart sounds are normal;  no edema.  


LUNGS: Respiratory rate normal; clear to auscultation.  


ABDOMEN: Soft,  nontender, liver spleen not palpable, no masses palpable.  


PSYCH: Alert and oriented x3;  mood  and affect song l.  


MUSCULOSKELETAL:No Clubbing/cyanosis;muscles-grossly intact.  Dressing over the 

right thigh incision site.  OA in other joints.








INVESTIGATIONS, reviewed in the clinical context:


February 21: White count 16.3 hemoglobin 11.4 potassium 4.2


February 20: White count 23.0 hemoglobin 11.9 platelets 230


February 14, 2025: White count 11.7 hemoglobin 15.6 platelets 261 sodium 139 

potassium 5.1 creatinine 1.0





Assessment plan:





-Anterior, right total hip arthroplasty


Aspirin 81 twice daily for DVT prophylaxis.  Received IV cefazolin for infection

prophylaxis.  Analgesics in place.





-Acute postprocedure blood loss anemia expected from surgery


Received 2 doses of IV Ferrlecit.  Oral iron





-Leukocytosis, likely reactive from surgery.  No clinical evidence of infection:

Improving


Denies any respiratory/urinary symptoms





-Primary osteoarthritis of multiple joints.  Also due for surgery in the left 

hip down the road.


Pain medication as needed





-Hyperlipidemia


Lipitor





Follow-up with PCP upon discharge





Thank you Dr. Martin





Past Medical History


Past Medical History: Hyperlipidemia


Additional Past Medical History / Comment(s): "right hip is worn out"


History of Any Multi-Drug Resistant Organisms: None Reported


Additional Past Surgical History / Comment(s): dental surgery, colonoscopy


Past Anesthesia/Blood Transfusion Reactions: No Reported Reaction


Past Psychological History: No Psychological Hx Reported


Smoking Status: Former smoker


Past Alcohol Use History: None Reported


Additional Past Alcohol Use History / Comment(s): quit smoking 11/2024 - smoked 

ppd x 43 yrs.


Past Drug Use History: None Reported